# Patient Record
Sex: FEMALE | Race: WHITE | NOT HISPANIC OR LATINO | Employment: FULL TIME | URBAN - METROPOLITAN AREA
[De-identification: names, ages, dates, MRNs, and addresses within clinical notes are randomized per-mention and may not be internally consistent; named-entity substitution may affect disease eponyms.]

---

## 2017-05-27 DIAGNOSIS — R51.9 HEADACHE, UNSPECIFIED HEADACHE TYPE: ICD-10-CM

## 2017-05-29 RX ORDER — BUTALBITAL, ACETAMINOPHEN AND CAFFEINE 50; 325; 40 MG/1; MG/1; MG/1
TABLET ORAL
Qty: 30 TABLET | OUTPATIENT
Start: 2017-05-29

## 2018-07-27 ENCOUNTER — OFFICE VISIT (OUTPATIENT)
Dept: FAMILY MEDICINE CLINIC | Facility: CLINIC | Age: 38
End: 2018-07-27

## 2018-07-27 VITALS
HEART RATE: 63 BPM | TEMPERATURE: 97.9 F | WEIGHT: 138 LBS | BODY MASS INDEX: 22.18 KG/M2 | DIASTOLIC BLOOD PRESSURE: 68 MMHG | RESPIRATION RATE: 16 BRPM | SYSTOLIC BLOOD PRESSURE: 110 MMHG | OXYGEN SATURATION: 99 % | HEIGHT: 66 IN

## 2018-07-27 DIAGNOSIS — E03.9 HYPOTHYROIDISM, ADULT: ICD-10-CM

## 2018-07-27 DIAGNOSIS — L70.0 CYSTIC ACNE VULGARIS: Primary | ICD-10-CM

## 2018-07-27 PROBLEM — G43.009 MIGRAINE WITHOUT AURA AND WITHOUT STATUS MIGRAINOSUS, NOT INTRACTABLE: Status: ACTIVE | Noted: 2017-12-14

## 2018-07-27 PROCEDURE — 99213 OFFICE O/P EST LOW 20 MIN: CPT | Performed by: PHYSICIAN ASSISTANT

## 2018-07-27 RX ORDER — SPIRONOLACTONE 25 MG/1
25 TABLET ORAL DAILY
Qty: 30 TABLET | Refills: 3 | Status: SHIPPED | OUTPATIENT
Start: 2018-07-27 | End: 2018-11-19 | Stop reason: SDUPTHER

## 2018-07-27 NOTE — PROGRESS NOTES
"Subjective   Olesya Chan is a 38 y.o. female.   Chief Complaint   Patient presents with   • cystic acne       History of Present Illness     Olesya is a 38 year old female who presents at office visit for cystic acne. Her states she was seen and [place] provider over the past due to insurance change.  She was seeing Dr. Duron,Dermatology, but can't afford to  see him anymore.  She has been on Minocycline and clindamycin without relief of symptoms.  Her dermatologist thought about adding Spirolactone.  Appetite and sleep has been normal.  She is  taking the Levoxyl.  Last TSH was  February 2018.  Olesya is using OTC Neutrogena products.  Last LMP was end of June 2018.      The following portions of the patient's history were reviewed and updated as appropriate: allergies, current medications, past family history, past medical history, past social history and past surgical history.    Review of Systems   Constitutional: Negative.    HENT: Negative.    Eyes: Negative.    Respiratory: Negative.    Cardiovascular: Negative.    Gastrointestinal: Negative.    Endocrine: Negative.    Genitourinary: Negative.    Musculoskeletal: Negative.    Skin: Positive for rash (cystic acne).   Allergic/Immunologic: Negative.    Neurological: Negative.    Hematological: Negative.    Psychiatric/Behavioral: Negative.  Negative for sleep disturbance and suicidal ideas.   All other systems reviewed and are negative.      Social History   Substance Use Topics   • Smoking status: Never Smoker   • Smokeless tobacco: Not on file   • Alcohol use Not on file     Vitals:    07/27/18 1036   BP: 110/68   BP Location: Right arm   Patient Position: Sitting   Cuff Size: Adult   Pulse: 63   Resp: 16   Temp: 97.9 °F (36.6 °C)   TempSrc: Oral   SpO2: 99%   Weight: 62.6 kg (138 lb)   Height: 167.6 cm (66\")       BP Readings from Last 3 Encounters:   07/27/18 110/68   04/15/16 116/68   01/06/16 112/62       Wt Readings from Last 3 Encounters: "   07/27/18 62.6 kg (138 lb)   04/15/16 59.4 kg (131 lb)   01/06/16 60.3 kg (133 lb 0.1 oz)     Body mass index is 22.27 kg/m².  Allergies   Allergen Reactions   • Sumatriptan Unknown (See Comments)     Was an infant       Objective   Physical Exam   Constitutional: She is oriented to person, place, and time. Vital signs are normal. She appears well-developed and well-nourished.   Neck: Trachea normal and phonation normal. Neck supple. No edema present. No thyromegaly present.   Cardiovascular: Normal rate, regular rhythm, S1 normal, S2 normal, normal heart sounds and normal pulses.    Pulmonary/Chest: Effort normal and breath sounds normal.   Abdominal: Soft. Normal appearance, normal aorta and bowel sounds are normal. There is no hepatomegaly. There is no tenderness.   Neurological: She is alert and oriented to person, place, and time.   Skin: Skin is warm, dry and intact. Capillary refill takes less than 2 seconds. Lesion noted.   several cystic acne lesions noted on face/chin.   Psychiatric: She has a normal mood and affect. Her speech is normal and behavior is normal. Judgment and thought content normal. Cognition and memory are normal.       Assessment/Plan   Olesya was seen today for cystic acne.    Diagnoses and all orders for this visit:    Cystic acne vulgaris    Hypothyroidism, adult    Other orders  -     spironolactone (ALDACTONE) 25 MG tablet; Take 1 tablet by mouth Daily.    1.  Stable hypothyroidism: Doing well with her thyroid medication.  She will return to office in 6 months for TSH.  2.  Chronic and uncontrolled cystic acne: We will add spironolactone to her minocycline medication.  I've asked her to schedule follow-up appointment in one month for reevaluation.

## 2018-09-12 ENCOUNTER — TELEPHONE (OUTPATIENT)
Dept: FAMILY MEDICINE CLINIC | Facility: CLINIC | Age: 38
End: 2018-09-12

## 2018-09-12 ENCOUNTER — OFFICE VISIT (OUTPATIENT)
Dept: FAMILY MEDICINE CLINIC | Facility: CLINIC | Age: 38
End: 2018-09-12

## 2018-09-12 VITALS
HEART RATE: 82 BPM | HEIGHT: 66 IN | RESPIRATION RATE: 16 BRPM | BODY MASS INDEX: 23.14 KG/M2 | OXYGEN SATURATION: 99 % | WEIGHT: 144 LBS | TEMPERATURE: 98 F | SYSTOLIC BLOOD PRESSURE: 108 MMHG | DIASTOLIC BLOOD PRESSURE: 72 MMHG

## 2018-09-12 DIAGNOSIS — G43.009 MIGRAINE WITHOUT AURA AND WITHOUT STATUS MIGRAINOSUS, NOT INTRACTABLE: Primary | ICD-10-CM

## 2018-09-12 PROCEDURE — 99213 OFFICE O/P EST LOW 20 MIN: CPT | Performed by: PHYSICIAN ASSISTANT

## 2018-09-12 RX ORDER — DIHYDROERGOTAMINE MESYLATE 4 MG/ML
1 SPRAY NASAL AS NEEDED
Qty: 1 EACH | Refills: 3 | Status: SHIPPED | OUTPATIENT
Start: 2018-09-12

## 2018-09-12 NOTE — PROGRESS NOTES
Subjective   Olesya Chan is a 38 y.o. female.   Chief Complaint   Patient presents with   • Migraine     management       History of Present Illness     Olesya is a 38-year-old female who presents for migraine headache management.  She has gained 6 pounds since July 27, 2018. States that she gets migraine headaches about every few months.  States that the Fioricet has not helped as well lately for her headaches. She has not taken any other medication for her headaches.  Caffeine intake 24 oz of pepsi a day.  Last eye exam was 2015.  Olesya had a migraine headaches started last Friday that lasted for 4 days.  States she took the old Fioricet over the weekend without relief of her headaches.  She had photophobia,phonophobia and had some nausea.  Headaches has improved now.  Appetite has been normal.  Sleep has been normal.  Last LMP was last week.  She took Imitrex in past and had a reaction.  States she was young and does not remember.    The following portions of the patient's history were reviewed and updated as appropriate: allergies, current medications, past family history, past medical history, past social history and past surgical history.    Review of Systems   Constitutional: Negative.    HENT: Negative for congestion, ear pain, postnasal drip, rhinorrhea, sinus pain, sinus pressure, sneezing and sore throat.    Eyes: Positive for photophobia.   Respiratory: Negative.  Negative for cough, shortness of breath and wheezing.    Cardiovascular: Negative.  Negative for chest pain, palpitations and leg swelling.   Gastrointestinal: Positive for nausea. Negative for vomiting.   Endocrine: Negative.    Genitourinary: Negative.    Musculoskeletal: Negative.    Skin: Negative.    Allergic/Immunologic: Negative.    Neurological: Positive for headaches. Negative for dizziness and light-headedness.   Hematological: Negative.    Psychiatric/Behavioral: Negative.    All other systems reviewed and are  "negative.    Social History   Substance Use Topics   • Smoking status: Never Smoker   • Smokeless tobacco: Not on file   • Alcohol use Not on file     Vitals:    09/12/18 1043   BP: 108/72   BP Location: Left arm   Patient Position: Sitting   Cuff Size: Adult   Pulse: 82   Resp: 16   Temp: 98 °F (36.7 °C)   TempSrc: Oral   SpO2: 99%   Weight: 65.3 kg (144 lb)   Height: 167.6 cm (66\")       Wt Readings from Last 3 Encounters:   09/12/18 65.3 kg (144 lb)   07/27/18 62.6 kg (138 lb)   04/15/16 59.4 kg (131 lb)       BP Readings from Last 3 Encounters:   09/12/18 108/72   07/27/18 110/68   04/15/16 116/68     Body mass index is 23.24 kg/m².  Allergies   Allergen Reactions   • Sumatriptan Unknown (See Comments)     Was a child-cause severe nausea and vomiting per mother       Objective   Physical Exam   Constitutional: She is oriented to person, place, and time. Vital signs are normal. She appears well-developed and well-nourished.   HENT:   Head: Normocephalic and atraumatic.   Right Ear: Hearing, tympanic membrane, external ear and ear canal normal.   Left Ear: Hearing, tympanic membrane, external ear and ear canal normal.   Nose: Nose normal. Right sinus exhibits no maxillary sinus tenderness and no frontal sinus tenderness. Left sinus exhibits no maxillary sinus tenderness and no frontal sinus tenderness.   Mouth/Throat: Uvula is midline, oropharynx is clear and moist and mucous membranes are normal.   Eyes: Pupils are equal, round, and reactive to light. Conjunctivae, EOM and lids are normal.   Neck: Trachea normal and phonation normal. Neck supple. No edema present. No thyromegaly present.   Cardiovascular: Normal rate, regular rhythm, S1 normal, S2 normal, normal heart sounds and normal pulses.    Pulmonary/Chest: Effort normal and breath sounds normal.   Abdominal: Soft. Normal appearance, normal aorta and bowel sounds are normal. There is no hepatomegaly. There is no tenderness.   Lymphadenopathy:     She has no " cervical adenopathy.   Neurological: She is alert and oriented to person, place, and time.   Skin: Skin is warm, dry and intact. Capillary refill takes less than 2 seconds.   Psychiatric: She has a normal mood and affect. Her speech is normal and behavior is normal. Judgment and thought content normal. Cognition and memory are normal.       Assessment/Plan   Olesya was seen today for migraine.    Diagnoses and all orders for this visit:    Migraine without aura and without status migrainosus, not intractable  -     dihydroergotamine (MIGRANAL) 4 MG/ML nasal spray; 1 spray into the nostril(s) as directed by provider As Needed for Migraine. No more than 4 sprays in one hour        Ms. Olesya Chan was seen in office today for chronic migraine headache management.  States she has been on the Fioricet medication since she was a teenager.  States the medication is not helping as well anymore for her migraine headaches.  I have spoken with her mother.  Mother states the Imitrex causes severe nausea and vomiting when she was a young child.  I've asked her to stop the Fioricet medication.  I have sent to pharmacy Migranal spray to use when the headache occurs.  I have asked Olesya to schedule follow-up appointment in one month for reevaluation.

## 2018-09-12 NOTE — TELEPHONE ENCOUNTER
----- Message from Ayaka Groves PA-C sent at 9/12/2018  1:21 PM EDT -----  Please notify patient that I have spoken with her mother.  She was a child when she took the Imitrex.  Caused severe nausea/vomiting.  I have sent a nasal spray to pharmacy to see if this will help with her migraine headaches.  Have her schedule follow-up appointment in one month for reevaluation.

## 2018-11-19 RX ORDER — SPIRONOLACTONE 25 MG/1
TABLET ORAL
Qty: 30 TABLET | Refills: 3 | Status: SHIPPED | OUTPATIENT
Start: 2018-11-19 | End: 2020-06-15 | Stop reason: DRUGHIGH

## 2019-06-24 ENCOUNTER — HOSPITAL ENCOUNTER (OUTPATIENT)
Dept: GENERAL RADIOLOGY | Facility: HOSPITAL | Age: 39
Discharge: HOME | End: 2019-06-24

## 2019-06-24 ENCOUNTER — HOSPITAL ENCOUNTER (OUTPATIENT)
Dept: GENERAL RADIOLOGY | Facility: HOSPITAL | Age: 39
Discharge: HOME | End: 2019-06-24
Admitting: PHYSICIAN ASSISTANT

## 2019-06-24 ENCOUNTER — OFFICE VISIT (OUTPATIENT)
Dept: FAMILY MEDICINE CLINIC | Facility: CLINIC | Age: 39
End: 2019-06-24

## 2019-06-24 VITALS
HEIGHT: 66 IN | HEART RATE: 73 BPM | BODY MASS INDEX: 22.98 KG/M2 | WEIGHT: 143 LBS | SYSTOLIC BLOOD PRESSURE: 118 MMHG | OXYGEN SATURATION: 100 % | DIASTOLIC BLOOD PRESSURE: 76 MMHG

## 2019-06-24 DIAGNOSIS — M25.552 CHRONIC LEFT HIP PAIN: ICD-10-CM

## 2019-06-24 DIAGNOSIS — M53.3 COCCYGEAL PAIN, CHRONIC: Primary | ICD-10-CM

## 2019-06-24 DIAGNOSIS — E03.9 HYPOTHYROIDISM, ADULT: ICD-10-CM

## 2019-06-24 DIAGNOSIS — M53.3 COCCYGEAL PAIN, CHRONIC: ICD-10-CM

## 2019-06-24 DIAGNOSIS — G89.29 COCCYGEAL PAIN, CHRONIC: ICD-10-CM

## 2019-06-24 DIAGNOSIS — G89.29 CHRONIC LEFT HIP PAIN: ICD-10-CM

## 2019-06-24 DIAGNOSIS — G89.29 COCCYGEAL PAIN, CHRONIC: Primary | ICD-10-CM

## 2019-06-24 PROCEDURE — 73502 X-RAY EXAM HIP UNI 2-3 VIEWS: CPT

## 2019-06-24 PROCEDURE — 99214 OFFICE O/P EST MOD 30 MIN: CPT | Performed by: PHYSICIAN ASSISTANT

## 2019-06-24 PROCEDURE — 72220 X-RAY EXAM SACRUM TAILBONE: CPT

## 2019-06-24 NOTE — PROGRESS NOTES
Subjective   Olesya Chan is a 39 y.o. female presents for   Chief Complaint   Patient presents with   • Back Pain     begins at tailbone into feet.  Saw a podiatrist, here for a follow up   • Hypothyroidism     Management       History of Present Illness     Olesya is a 39-year-old female who presents for pain above her tailbone that radiates to her left leg down to her left foot. She has seen  Dr. Leal who thinks it might spinal stenosis or neurogenic.  She has had chronic pain in those areas for the past year or so.  States she recalls falling on her tailbone in school when a chair was pulled out from under her.  She stands a lot in one place.  Describes the pain as a sharp pain that is constant.  The pain increases with sanding or other movements.  Rates the pain at office visit today as a 5 out of 10.  She doesn't take any OTC medications. Olesya  Has used ice and heat which has helped.  Bowel movements are daily without dark black tarry stools.   Denied any dark black tarry stools.  Appetite has been normal.  She was seen at [facility] in 2017's- 2018 due to her [employer] insurance.  States she has not seen them since this time.  She has been taking her thyroid medication daily.  Would like this checked today.  She has not had blood work in the past year or so.  She has noticed some increased constipation issues.  She has increased her water and fiber intake.    The following portions of the patient's history were reviewed and updated as appropriate: allergies, current medications, past family history, past medical history, past social history, past surgical history and problem list.    Review of Systems   Constitutional: Negative.  Negative for chills and fever.   HENT: Negative.    Eyes: Negative.    Respiratory: Negative.  Negative for cough, shortness of breath and wheezing.    Cardiovascular: Negative.  Negative for chest pain, palpitations and leg swelling.   Gastrointestinal: Positive  "for constipation. Negative for abdominal distention, abdominal pain, anal bleeding, blood in stool, diarrhea, nausea, rectal pain and vomiting.   Endocrine: Negative.    Genitourinary: Negative.    Musculoskeletal: Negative.         Chronic Tailbone pain to left hip to let leg to let foot.   Skin: Negative.    Allergic/Immunologic: Negative.    Neurological: Negative.  Negative for dizziness, weakness, light-headedness, numbness and headaches.   Hematological: Negative.    Psychiatric/Behavioral: Negative.    All other systems reviewed and are negative.        Vitals:    06/24/19 1517   BP: 118/76   Pulse: 73   SpO2: 100%   Weight: 64.9 kg (143 lb)   Height: 167.6 cm (66\")     Wt Readings from Last 3 Encounters:   06/24/19 64.9 kg (143 lb)   09/12/18 65.3 kg (144 lb)   07/27/18 62.6 kg (138 lb)     BP Readings from Last 3 Encounters:   06/24/19 118/76   09/12/18 108/72   07/27/18 110/68     Social History     Socioeconomic History   • Marital status: Single     Spouse name: Not on file   • Number of children: Not on file   • Years of education: Not on file   • Highest education level: Not on file   Tobacco Use   • Smoking status: Never Smoker       Allergies   Allergen Reactions   • Sumatriptan Unknown (See Comments)     Was a child-cause severe nausea and vomiting per mother       Body mass index is 23.08 kg/m².    Objective   Physical Exam   Constitutional: She is oriented to person, place, and time. Vital signs are normal. She appears well-developed and well-nourished.   Neck: Trachea normal and phonation normal. Neck supple. No thyroid mass and no thyromegaly present.   Cardiovascular: Normal rate, regular rhythm, S1 normal, S2 normal, normal heart sounds and normal pulses.   No murmur heard.  Pulmonary/Chest: Effort normal and breath sounds normal.   Abdominal: Soft. Normal appearance, normal aorta and bowel sounds are normal. There is no hepatomegaly. There is no tenderness.   Musculoskeletal: Normal range of " motion.        Right hip: Normal.        Left hip: Normal.        Lumbar back: She exhibits tenderness and bony tenderness. She exhibits normal range of motion, no swelling, no pain, no spasm and normal pulse.        Back:    Low Back: Slightly tender at sacral area.  Full range of motion is noted in all fields, 5/5 bilateral muscle strength, 2+ DTRs, 2+ distal pulses, negative straight leg raise and good gait/heel-toe walk noted.   Neurological: She is alert and oriented to person, place, and time. She has normal strength. No sensory deficit. She displays a negative Romberg sign.   Reflex Scores:       Patellar reflexes are 2+ on the right side and 2+ on the left side.  Skin: Skin is warm, dry and intact. Capillary refill takes less than 2 seconds.   Psychiatric: She has a normal mood and affect. Her speech is normal and behavior is normal. Judgment and thought content normal. Cognition and memory are normal.       Assessment/Plan   Olesya was seen today for back pain and hypothyroidism.    Diagnoses and all orders for this visit:    Coccygeal pain, chronic  -     XR sacrum and coccyx; Future    Chronic left hip pain  -     XR Hip With or Without Pelvis 2 - 3 View Left; Future    Hypothyroidism, adult  -     TSH      1.  Chronic left hip and coccyx/sacrum pain: She has had pain in these areas for the past several years.  She has been seeing podiatrist, , who thinks this may be a spinal stenosis issue.  She has not had any imaging studies.  She will have an x-ray of sacrum/coccyx and left hip x-ray [place] office today.  She will be notified of test results when completed.  I have asked her to continue using over-the-counter NSAIDs for now.  Will consider physical therapy or additional imaging studies in future if warranted.  2.  Chronic hypothyroidism: We will check a TSH at office visit today.  She will be notified of test results and any medication changes.    [facility]  [facility]  [address] [id number]  Dept: [phone]  Dept Fax: [phone]  Loc: [phone]  Loc Fax: [phone]

## 2019-06-25 DIAGNOSIS — E03.9 HYPOTHYROIDISM, ADULT: Primary | ICD-10-CM

## 2019-06-25 LAB — TSH SERPL DL<=0.005 MIU/L-ACNC: 6.86 MIU/ML (ref 0.27–4.2)

## 2019-06-25 RX ORDER — LEVOTHYROXINE SODIUM 0.1 MG/1
100 TABLET ORAL DAILY
Qty: 30 TABLET | Refills: 3 | Status: SHIPPED | OUTPATIENT
Start: 2019-06-25 | End: 2019-07-03 | Stop reason: SDUPTHER

## 2019-07-03 DIAGNOSIS — E03.9 HYPOTHYROIDISM, ADULT: ICD-10-CM

## 2019-07-03 RX ORDER — LEVOTHYROXINE SODIUM 0.1 MG/1
100 TABLET ORAL DAILY
Qty: 30 TABLET | Refills: 3 | Status: SHIPPED | OUTPATIENT
Start: 2019-07-03 | End: 2019-09-29 | Stop reason: SDUPTHER

## 2019-08-06 ENCOUNTER — TELEPHONE (OUTPATIENT)
Dept: FAMILY MEDICINE CLINIC | Facility: CLINIC | Age: 39
End: 2019-08-06

## 2019-08-06 NOTE — TELEPHONE ENCOUNTER
Patient is requesting Rx Phenergan.  She said the nose spray for migraines is causing her to be nauseated.

## 2019-08-07 DIAGNOSIS — G43.009 MIGRAINE WITHOUT AURA AND WITHOUT STATUS MIGRAINOSUS, NOT INTRACTABLE: Primary | ICD-10-CM

## 2019-08-07 RX ORDER — PROMETHAZINE HYDROCHLORIDE 25 MG/1
25 TABLET ORAL EVERY 6 HOURS PRN
Qty: 20 TABLET | Refills: 0 | Status: SHIPPED | OUTPATIENT
Start: 2019-08-07

## 2019-08-25 ENCOUNTER — RESULTS ENCOUNTER (OUTPATIENT)
Dept: FAMILY MEDICINE CLINIC | Facility: CLINIC | Age: 39
End: 2019-08-25

## 2019-08-25 DIAGNOSIS — E03.9 HYPOTHYROIDISM, ADULT: ICD-10-CM

## 2019-09-29 DIAGNOSIS — E03.9 HYPOTHYROIDISM, ADULT: ICD-10-CM

## 2019-09-30 DIAGNOSIS — E03.9 HYPOTHYROIDISM, ADULT: ICD-10-CM

## 2019-09-30 RX ORDER — LEVOTHYROXINE SODIUM 0.1 MG/1
100 TABLET ORAL DAILY
Qty: 30 TABLET | Refills: 0 | Status: SHIPPED | OUTPATIENT
Start: 2019-09-30 | End: 2019-10-09 | Stop reason: SDUPTHER

## 2019-09-30 RX ORDER — LEVOTHYROXINE SODIUM 0.1 MG/1
100 TABLET ORAL DAILY
Qty: 30 TABLET | Refills: 0 | Status: SHIPPED | OUTPATIENT
Start: 2019-09-30 | End: 2019-10-14 | Stop reason: SDUPTHER

## 2019-10-04 LAB — TSH SERPL DL<=0.005 MIU/L-ACNC: 0.77 UIU/ML (ref 0.27–4.2)

## 2019-10-09 DIAGNOSIS — E03.9 HYPOTHYROIDISM, ADULT: ICD-10-CM

## 2019-10-09 RX ORDER — LEVOTHYROXINE SODIUM 0.1 MG/1
100 TABLET ORAL DAILY
Qty: 30 TABLET | Refills: 0 | Status: SHIPPED | OUTPATIENT
Start: 2019-10-09 | End: 2019-11-01 | Stop reason: SDUPTHER

## 2019-10-14 DIAGNOSIS — E03.9 HYPOTHYROIDISM, ADULT: ICD-10-CM

## 2019-10-14 RX ORDER — LEVOTHYROXINE SODIUM 0.1 MG/1
100 TABLET ORAL DAILY
Qty: 30 TABLET | Refills: 0 | Status: SHIPPED | OUTPATIENT
Start: 2019-10-14 | End: 2019-11-01 | Stop reason: SDUPTHER

## 2019-11-01 RX ORDER — LEVOTHYROXINE SODIUM 0.1 MG/1
TABLET ORAL
Qty: 30 TABLET | Refills: 12 | Status: SHIPPED | OUTPATIENT
Start: 2019-11-01 | End: 2020-12-21

## 2020-06-15 ENCOUNTER — OFFICE VISIT (OUTPATIENT)
Dept: FAMILY MEDICINE CLINIC | Facility: CLINIC | Age: 40
End: 2020-06-15

## 2020-06-15 VITALS
WEIGHT: 138 LBS | BODY MASS INDEX: 22.18 KG/M2 | RESPIRATION RATE: 16 BRPM | TEMPERATURE: 97.2 F | HEART RATE: 84 BPM | DIASTOLIC BLOOD PRESSURE: 78 MMHG | HEIGHT: 66 IN | OXYGEN SATURATION: 99 % | SYSTOLIC BLOOD PRESSURE: 112 MMHG

## 2020-06-15 DIAGNOSIS — S39.012A LOW BACK STRAIN, INITIAL ENCOUNTER: Primary | ICD-10-CM

## 2020-06-15 PROCEDURE — 99213 OFFICE O/P EST LOW 20 MIN: CPT | Performed by: PHYSICIAN ASSISTANT

## 2020-06-15 RX ORDER — METHOCARBAMOL 750 MG/1
750 TABLET, FILM COATED ORAL 4 TIMES DAILY PRN
Qty: 60 TABLET | Refills: 0 | Status: SHIPPED | OUTPATIENT
Start: 2020-06-15

## 2020-06-15 RX ORDER — MELOXICAM 15 MG/1
15 TABLET ORAL DAILY
Qty: 30 TABLET | Refills: 0 | Status: SHIPPED | OUTPATIENT
Start: 2020-06-15

## 2020-06-15 RX ORDER — METHYLPREDNISOLONE 4 MG/1
TABLET ORAL
Qty: 21 TABLET | Refills: 0 | Status: SHIPPED | OUTPATIENT
Start: 2020-06-15

## 2020-06-15 RX ORDER — SPIRONOLACTONE 100 MG/1
100 TABLET, FILM COATED ORAL DAILY
COMMUNITY
Start: 2020-05-05

## 2020-06-15 NOTE — PATIENT INSTRUCTIONS
Back Exercises  These exercises help to make your trunk and back strong. They also help to keep the lower back flexible. Doing these exercises can help to prevent back pain or lessen existing pain.  · If you have back pain, try to do these exercises 2-3 times each day or as told by your doctor.  · As you get better, do the exercises once each day. Repeat the exercises more often as told by your doctor.  · To stop back pain from coming back, do the exercises once each day, or as told by your doctor.  Exercises  Single knee to chest  Do these steps 3-5 times in a row for each le. Lie on your back on a firm bed or the floor with your legs stretched out.  2. Bring one knee to your chest.  3. Grab your knee or thigh with both hands and hold them it in place.  4. Pull on your knee until you feel a gentle stretch in your lower back or buttocks.  5. Keep doing the stretch for 10-30 seconds.  6. Slowly let go of your leg and straighten it.  Pelvic tilt  Do these steps 5-10 times in a row:  1. Lie on your back on a firm bed or the floor with your legs stretched out.  2. Bend your knees so they point up to the ceiling. Your feet should be flat on the floor.  3. Tighten your lower belly (abdomen) muscles to press your lower back against the floor. This will make your tailbone point up to the ceiling instead of pointing down to your feet or the floor.  4. Stay in this position for 5-10 seconds while you gently tighten your muscles and breathe evenly.  Cat-cow  Do these steps until your lower back bends more easily:  1. Get on your hands and knees on a firm surface. Keep your hands under your shoulders, and keep your knees under your hips. You may put padding under your knees.  2. Let your head hang down toward your chest. Tighten (contract) the muscles in your belly. Point your tailbone toward the floor so your lower back becomes rounded like the back of a cat.  3. Stay in this position for 5 seconds.  4. Slowly lift your  head. Let the muscles of your belly relax. Point your tailbone up toward the ceiling so your back forms a sagging arch like the back of a cow.  5. Stay in this position for 5 seconds.    Press-ups  Do these steps 5-10 times in a row:  1. Lie on your belly (face-down) on the floor.  2. Place your hands near your head, about shoulder-width apart.  3. While you keep your back relaxed and keep your hips on the floor, slowly straighten your arms to raise the top half of your body and lift your shoulders. Do not use your back muscles. You may change where you place your hands in order to make yourself more comfortable.  4. Stay in this position for 5 seconds.  5. Slowly return to lying flat on the floor.    Bridges  Do these steps 10 times in a row:  1. Lie on your back on a firm surface.  2. Bend your knees so they point up to the ceiling. Your feet should be flat on the floor. Your arms should be flat at your sides, next to your body.  3. Tighten your butt muscles and lift your butt off the floor until your waist is almost as high as your knees. If you do not feel the muscles working in your butt and the back of your thighs, slide your feet 1-2 inches farther away from your butt.  4. Stay in this position for 3-5 seconds.  5. Slowly lower your butt to the floor, and let your butt muscles relax.  If this exercise is too easy, try doing it with your arms crossed over your chest.  Belly crunches  Do these steps 5-10 times in a row:  1. Lie on your back on a firm bed or the floor with your legs stretched out.  2. Bend your knees so they point up to the ceiling. Your feet should be flat on the floor.  3. Cross your arms over your chest.  4. Tip your chin a little bit toward your chest but do not bend your neck.  5. Tighten your belly muscles and slowly raise your chest just enough to lift your shoulder blades a tiny bit off of the floor. Avoid raising your body higher than that, because it can put too much stress on your low  back.  6. Slowly lower your chest and your head to the floor.  Back lifts  Do these steps 5-10 times in a row:  1. Lie on your belly (face-down) with your arms at your sides, and rest your forehead on the floor.  2. Tighten the muscles in your legs and your butt.  3. Slowly lift your chest off of the floor while you keep your hips on the floor. Keep the back of your head in line with the curve in your back. Look at the floor while you do this.  4. Stay in this position for 3-5 seconds.  5. Slowly lower your chest and your face to the floor.  Contact a doctor if:  · Your back pain gets a lot worse when you do an exercise.  · Your back pain does not get better 2 hours after you exercise.  If you have any of these problems, stop doing the exercises. Do not do them again unless your doctor says it is okay.  Get help right away if:  · You have sudden, very bad back pain. If this happens, stop doing the exercises. Do not do them again unless your doctor says it is okay.  This information is not intended to replace advice given to you by your health care provider. Make sure you discuss any questions you have with your health care provider.  Document Released: 01/20/2012 Document Revised: 09/12/2019 Document Reviewed: 09/12/2019  [employer]vier Patient Education © 2020 [employer]

## 2020-06-15 NOTE — PROGRESS NOTES
Subjective   Olesya Chan is a 40 y.o. female presents for   Chief Complaint   Patient presents with   • Back Pain       History of Present Illness     Olesya is a 40-year-old female who presents with new onset low back pain.  States she was moving a heavy [place] on June, 2020 at home.  Olesya states she was trying to clean up her yard and was trying to move a heavy [place].  States the [place] probably weighed 25-50 pounds.  She picked up the [place] slightly to roll and onto a tarp.  She then pulled the tarp across the yard to dispose of the [place].  Since this time she has been having pain in her lower back.  The pain goes across her lower back.  Olesya describes the pain as a sharp pain that comes and goes.  The pain is worse when she goes to lie down.  The pain is improving over the past week.  She has been using cold packs and ibuprofen with minimal relief.  She is also used icy hot topically.  Her sleep has been restless due to the back pain.  Bowel movements have been normal.  She did have pain at first with the bowel movements but this has since improved.  Denied any radiation of pain going down her legs.  She has not had any abdominal pain or GI upset.  Appetite has been normal.    The following portions of the patient's history were reviewed and updated as appropriate: allergies, current medications, past family history, past medical history, past social history, past surgical history and problem list.    Review of Systems   Constitutional: Negative.    HENT: Negative.    Eyes: Negative.    Respiratory: Negative.    Cardiovascular: Negative.    Gastrointestinal: Negative.  Negative for abdominal pain, blood in stool, constipation, diarrhea, nausea, rectal pain and vomiting.   Endocrine: Negative.    Genitourinary: Negative.  Negative for decreased urine volume, dysuria, flank pain, frequency, hematuria, pelvic pain, urgency, vaginal bleeding, vaginal discharge and vaginal pain.  "  Musculoskeletal: Positive for back pain. Negative for gait problem.   Skin: Negative.    Allergic/Immunologic: Negative.    Neurological: Negative.    Hematological: Negative.    Psychiatric/Behavioral: Positive for sleep disturbance.   All other systems reviewed and are negative.        Vitals:    06/15/20 0937   BP: 112/78   Pulse: 84   Resp: 16   Temp: 97.2 °F (36.2 °C)   SpO2: 99%   Weight: 62.6 kg (138 lb)   Height: 167.6 cm (66\")     Wt Readings from Last 3 Encounters:   06/15/20 62.6 kg (138 lb)   06/24/19 64.9 kg (143 lb)   09/12/18 65.3 kg (144 lb)     BP Readings from Last 3 Encounters:   06/15/20 112/78   06/24/19 118/76   09/12/18 108/72     Social History     Socioeconomic History   • Marital status: Single     Spouse name: Not on file   • Number of children: Not on file   • Years of education: Not on file   • Highest education level: Not on file   Tobacco Use   • Smoking status: Never Smoker       Allergies   Allergen Reactions   • Sumatriptan Unknown (See Comments)     Was a child-cause severe nausea and vomiting per mother       Body mass index is 22.27 kg/m².    Objective   Physical Exam   Constitutional: She is oriented to person, place, and time. Vital signs are normal. She appears well-developed and well-nourished.   Sitting on exam table wearing a facial mask   HENT:   Head: Normocephalic and atraumatic.   Neck: Trachea normal and phonation normal. Neck supple. No tracheal tenderness present. No tracheal deviation and no edema present.   Cardiovascular: Normal rate, regular rhythm, S1 normal, S2 normal, normal heart sounds and normal pulses.   No murmur heard.  Pulmonary/Chest: Effort normal and breath sounds normal.   Abdominal: Soft. Normal appearance, normal aorta and bowel sounds are normal. There is no hepatomegaly. There is no tenderness.   Musculoskeletal:        Lumbar back: She exhibits decreased range of motion, tenderness, swelling and spasm. She exhibits no bony tenderness, no pain " and normal pulse.        Back:    Low back: Slightly tender to palpation across lower lumbar spinous muscles.  Slight decreased range of motion on extension and flexion.  5/5 bilateral muscle strength, 2+ DTR's, 2+ distal pulses, negative straight leg raise and good gait/heel-toe walk noted.   Neurological: She is alert and oriented to person, place, and time. She has normal strength and normal reflexes. No sensory deficit.   Reflex Scores:       Patellar reflexes are 2+ on the right side and 2+ on the left side.  Skin: Skin is warm, dry and intact. Capillary refill takes less than 2 seconds.   Psychiatric: She has a normal mood and affect. Her speech is normal and behavior is normal. Judgment and thought content normal. Cognition and memory are normal.      I was wearing surgical mask during the entire office visit encounter.      Assessment/Plan   Olesya was seen today for back pain.    Diagnoses and all orders for this visit:    Low back strain, initial encounter  -     methylPREDNISolone (MEDROL, NITESH,) 4 MG tablet; Take as directed on package instructions.  -     methocarbamol (ROBAXIN) 750 MG tablet; Take 1 tablet by mouth 4 (Four) Times a Day As Needed for Muscle Spasms.  -     meloxicam (MOBIC) 15 MG tablet; Take 1 tablet by mouth Daily.    Ms. Olesya Chan was seen in office today and diagnosed with new low back pain strain: I prescribed Medrol Dosepak, Robaxin and meloxicam medications to pharmacy.  I have given Olesya exercises to do at home as well.  If no improvement, will consider imaging studies/physical therapy in future if warranted.  She may continue using moist heat to the area.  She was directed on proper lifting techniques.      [facility]  [address] [address]  Dept: [phone]  Dept Fax: [phone]  Loc: [phone]  Loc Fax: [phone]

## 2020-10-01 DIAGNOSIS — E03.9 HYPOTHYROIDISM, ADULT: Primary | ICD-10-CM

## 2020-10-01 DIAGNOSIS — Z11.59 NEED FOR HEPATITIS C SCREENING TEST: ICD-10-CM

## 2020-10-03 LAB
FT4I SERPL CALC-MCNC: 2.4 (ref 1.2–4.9)
HCV AB S/CO SERPL IA: <0.1 S/CO RATIO (ref 0–0.9)
T3RU NFR SERPL: 24 % (ref 24–39)
T4 SERPL-MCNC: 9.8 UG/DL (ref 4.5–12)
TSH SERPL DL<=0.005 MIU/L-ACNC: 0.83 UIU/ML (ref 0.45–4.5)

## 2020-12-21 RX ORDER — LEVOTHYROXINE SODIUM 0.1 MG/1
TABLET ORAL
Qty: 90 TABLET | Refills: 4 | Status: SHIPPED | OUTPATIENT
Start: 2020-12-21 | End: 2022-01-10

## 2022-01-10 RX ORDER — LEVOTHYROXINE SODIUM 0.1 MG/1
100 TABLET ORAL
Qty: 30 TABLET | Refills: 0 | Status: SHIPPED | OUTPATIENT
Start: 2022-01-10 | End: 2022-02-03

## 2022-02-03 RX ORDER — LEVOTHYROXINE SODIUM 0.1 MG/1
100 TABLET ORAL
Qty: 15 TABLET | Refills: 0 | Status: SHIPPED | OUTPATIENT
Start: 2022-02-03 | End: 2022-02-13

## 2022-02-13 ENCOUNTER — TELEPHONE (OUTPATIENT)
Dept: FAMILY MEDICINE CLINIC | Facility: CLINIC | Age: 42
End: 2022-02-13

## 2022-02-13 RX ORDER — LEVOTHYROXINE SODIUM 0.1 MG/1
100 TABLET ORAL
Qty: 7 TABLET | Refills: 0 | Status: SHIPPED | OUTPATIENT
Start: 2022-02-13

## 2022-02-13 NOTE — TELEPHONE ENCOUNTER
Please call patient and have her schedule an office appointment with labs.  Has not been seen in office since Emerald 15, 2020.  Will send in 1 week supply of her thyroid medicine to pharmacy.

## 2022-02-16 NOTE — TELEPHONE ENCOUNTER
HUB TO READ:     Left another vm for patient to call office and schedule an appt to get meds refilled.

## 2022-08-25 RX ORDER — LEVOTHYROXINE SODIUM 0.1 MG/1
TABLET ORAL
Qty: 7 TABLET | Refills: 0 | OUTPATIENT
Start: 2022-08-25

## 2022-08-25 NOTE — TELEPHONE ENCOUNTER
Please call patient have her schedule an appointment for updated blood work and physical.  Last seen in office in 2020.